# Patient Record
Sex: FEMALE | Race: WHITE | Employment: PART TIME | URBAN - METROPOLITAN AREA
[De-identification: names, ages, dates, MRNs, and addresses within clinical notes are randomized per-mention and may not be internally consistent; named-entity substitution may affect disease eponyms.]

---

## 2019-06-10 PROCEDURE — 88175 CYTOPATH C/V AUTO FLUID REDO: CPT | Performed by: OBSTETRICS & GYNECOLOGY

## 2023-12-16 ENCOUNTER — HOSPITAL ENCOUNTER (INPATIENT)
Facility: HOSPITAL | Age: 33
LOS: 3 days | Discharge: HOME | End: 2023-12-19
Attending: EMERGENCY MEDICINE | Admitting: HOSPITALIST
Payer: COMMERCIAL

## 2023-12-16 ENCOUNTER — APPOINTMENT (OUTPATIENT)
Dept: ULTRASOUND IMAGING | Age: 33
End: 2023-12-16
Attending: EMERGENCY MEDICINE
Payer: COMMERCIAL

## 2023-12-16 ENCOUNTER — APPOINTMENT (OUTPATIENT)
Dept: CT IMAGING | Age: 33
End: 2023-12-16
Attending: EMERGENCY MEDICINE
Payer: COMMERCIAL

## 2023-12-16 DIAGNOSIS — R74.01 TRANSAMINITIS: Primary | ICD-10-CM
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
## 2023-12-16 DIAGNOSIS — S09.90XA INJURY OF HEAD, INITIAL ENCOUNTER: ICD-10-CM

## 2023-12-16 PROBLEM — E87.1 HYPONATREMIA: Status: ACTIVE | Noted: 2023-12-16

## 2023-12-16 PROBLEM — E87.20 METABOLIC ACIDOSIS: Status: ACTIVE | Noted: 2023-12-16

## 2023-12-16 PROBLEM — F10.939 ALCOHOL WITHDRAWAL (HCC): Status: ACTIVE | Noted: 2023-12-16

## 2023-12-16 PROBLEM — E87.6 HYPOKALEMIA: Status: ACTIVE | Noted: 2023-12-16

## 2023-12-16 LAB
ALBUMIN SERPL-MCNC: 4.1 G/DL (ref 3.4–5)
ALBUMIN/GLOB SERPL: 0.8 {RATIO} (ref 1–2)
ALP LIVER SERPL-CCNC: 127 U/L
ALT SERPL-CCNC: 212 U/L
ANION GAP SERPL CALC-SCNC: 14 MMOL/L (ref 0–18)
ANTIBODY SCREEN: NEGATIVE
AST SERPL-CCNC: 427 U/L (ref 15–37)
BASOPHILS # BLD AUTO: 0.06 X10(3) UL (ref 0–0.2)
BASOPHILS NFR BLD AUTO: 0.7 %
BILIRUB SERPL-MCNC: 1.7 MG/DL (ref 0.1–2)
BUN BLD-MCNC: 4 MG/DL (ref 9–23)
CALCIUM BLD-MCNC: 9.4 MG/DL (ref 8.5–10.1)
CHLORIDE SERPL-SCNC: 96 MMOL/L (ref 98–112)
CK SERPL-CCNC: 153 U/L
CO2 SERPL-SCNC: 21 MMOL/L (ref 21–32)
CREAT BLD-MCNC: 0.54 MG/DL
EGFRCR SERPLBLD CKD-EPI 2021: 125 ML/MIN/1.73M2 (ref 60–?)
EOSINOPHIL # BLD AUTO: 0.02 X10(3) UL (ref 0–0.7)
EOSINOPHIL NFR BLD AUTO: 0.2 %
ERYTHROCYTE [DISTWIDTH] IN BLOOD BY AUTOMATED COUNT: 14.5 %
ETHANOL SERPL-MCNC: <3 MG/DL (ref ?–3)
GLOBULIN PLAS-MCNC: 5 G/DL (ref 2.8–4.4)
GLUCOSE BLD-MCNC: 99 MG/DL (ref 70–99)
HCT VFR BLD AUTO: 35.8 %
HGB BLD-MCNC: 12.5 G/DL
IMM GRANULOCYTES # BLD AUTO: 0.16 X10(3) UL (ref 0–1)
IMM GRANULOCYTES NFR BLD: 1.8 %
LYMPHOCYTES # BLD AUTO: 0.82 X10(3) UL (ref 1–4)
LYMPHOCYTES NFR BLD AUTO: 9 %
MCH RBC QN AUTO: 32.8 PG (ref 26–34)
MCHC RBC AUTO-ENTMCNC: 34.9 G/DL (ref 31–37)
MCV RBC AUTO: 94 FL
MONOCYTES # BLD AUTO: 1.2 X10(3) UL (ref 0.1–1)
MONOCYTES NFR BLD AUTO: 13.2 %
NEUTROPHILS # BLD AUTO: 6.81 X10 (3) UL (ref 1.5–7.7)
NEUTROPHILS # BLD AUTO: 6.81 X10(3) UL (ref 1.5–7.7)
NEUTROPHILS NFR BLD AUTO: 75.1 %
OSMOLALITY SERPL CALC.SUM OF ELEC: 269 MOSM/KG (ref 275–295)
PLATELET # BLD AUTO: 192 10(3)UL (ref 150–450)
POTASSIUM SERPL-SCNC: 3.4 MMOL/L (ref 3.5–5.1)
PROT SERPL-MCNC: 9.1 G/DL (ref 6.4–8.2)
RBC # BLD AUTO: 3.81 X10(6)UL
RH BLOOD TYPE: POSITIVE
SODIUM SERPL-SCNC: 131 MMOL/L (ref 136–145)
WBC # BLD AUTO: 9.1 X10(3) UL (ref 4–11)

## 2023-12-16 PROCEDURE — 70450 CT HEAD/BRAIN W/O DYE: CPT | Performed by: EMERGENCY MEDICINE

## 2023-12-16 PROCEDURE — HZ2ZZZZ DETOXIFICATION SERVICES FOR SUBSTANCE ABUSE TREATMENT: ICD-10-PCS | Performed by: HOSPITALIST

## 2023-12-16 PROCEDURE — 70486 CT MAXILLOFACIAL W/O DYE: CPT | Performed by: EMERGENCY MEDICINE

## 2023-12-16 PROCEDURE — 76700 US EXAM ABDOM COMPLETE: CPT | Performed by: EMERGENCY MEDICINE

## 2023-12-16 RX ORDER — LORAZEPAM 2 MG/ML
1 INJECTION INTRAMUSCULAR
Status: DISCONTINUED | OUTPATIENT
Start: 2023-12-16 | End: 2023-12-19

## 2023-12-16 RX ORDER — THIAMINE HYDROCHLORIDE 100 MG/ML
100 INJECTION, SOLUTION INTRAMUSCULAR; INTRAVENOUS ONCE
Status: COMPLETED | OUTPATIENT
Start: 2023-12-16 | End: 2023-12-16

## 2023-12-16 RX ORDER — MELATONIN
100 DAILY
Status: DISCONTINUED | OUTPATIENT
Start: 2023-12-17 | End: 2023-12-19

## 2023-12-16 RX ORDER — POLYETHYLENE GLYCOL 3350 17 G/17G
17 POWDER, FOR SOLUTION ORAL DAILY PRN
Status: DISCONTINUED | OUTPATIENT
Start: 2023-12-16 | End: 2023-12-19

## 2023-12-16 RX ORDER — LORAZEPAM 1 MG/1
1 TABLET ORAL
Status: DISCONTINUED | OUTPATIENT
Start: 2023-12-16 | End: 2023-12-19

## 2023-12-16 RX ORDER — LORAZEPAM 2 MG/ML
1 INJECTION INTRAMUSCULAR ONCE
Status: DISCONTINUED | OUTPATIENT
Start: 2023-12-16 | End: 2023-12-16

## 2023-12-16 RX ORDER — SENNOSIDES 8.6 MG
17.2 TABLET ORAL NIGHTLY PRN
Status: DISCONTINUED | OUTPATIENT
Start: 2023-12-16 | End: 2023-12-19

## 2023-12-16 RX ORDER — FOLIC ACID 1 MG/1
1 TABLET ORAL DAILY
Status: DISCONTINUED | OUTPATIENT
Start: 2023-12-16 | End: 2023-12-19

## 2023-12-16 RX ORDER — PROCHLORPERAZINE EDISYLATE 5 MG/ML
5 INJECTION INTRAMUSCULAR; INTRAVENOUS EVERY 8 HOURS PRN
Status: DISCONTINUED | OUTPATIENT
Start: 2023-12-16 | End: 2023-12-19

## 2023-12-16 RX ORDER — MULTIVITAMIN WITH IRON
50 TABLET ORAL DAILY
Status: ON HOLD | COMMUNITY
End: 2023-12-16

## 2023-12-16 RX ORDER — BISACODYL 10 MG
10 SUPPOSITORY, RECTAL RECTAL
Status: DISCONTINUED | OUTPATIENT
Start: 2023-12-16 | End: 2023-12-19

## 2023-12-16 RX ORDER — ONDANSETRON 2 MG/ML
4 INJECTION INTRAMUSCULAR; INTRAVENOUS EVERY 6 HOURS PRN
Status: DISCONTINUED | OUTPATIENT
Start: 2023-12-16 | End: 2023-12-19

## 2023-12-16 RX ORDER — MULTIPLE VITAMINS W/ MINERALS TAB 9MG-400MCG
1 TAB ORAL DAILY
Status: DISCONTINUED | OUTPATIENT
Start: 2023-12-16 | End: 2023-12-19

## 2023-12-16 RX ORDER — SODIUM CHLORIDE 9 MG/ML
INJECTION, SOLUTION INTRAVENOUS CONTINUOUS
Status: DISCONTINUED | OUTPATIENT
Start: 2023-12-16 | End: 2023-12-19

## 2023-12-16 RX ORDER — LORAZEPAM 2 MG/ML
2 INJECTION INTRAMUSCULAR
Status: DISCONTINUED | OUTPATIENT
Start: 2023-12-16 | End: 2023-12-19

## 2023-12-16 RX ORDER — MELATONIN
325
COMMUNITY

## 2023-12-16 RX ORDER — LORAZEPAM 1 MG/1
2 TABLET ORAL
Status: DISCONTINUED | OUTPATIENT
Start: 2023-12-16 | End: 2023-12-19

## 2023-12-16 RX ORDER — LORAZEPAM 2 MG/ML
2 INJECTION INTRAMUSCULAR ONCE
Status: COMPLETED | OUTPATIENT
Start: 2023-12-16 | End: 2023-12-16

## 2023-12-16 NOTE — ED INITIAL ASSESSMENT (HPI)
PT WAS RECENTLY DIAGNOSED W ANEMIA HGB 6.5 2 MOS AGO HAD A TRANSFUSION 2 DAYS AGO WOKE UP W R FACIAL SWELLING  AND BRUISING.  COVID 2 WEEKS AGO, SORE ALL OVER NO SOB

## 2023-12-17 ENCOUNTER — NURSE ONLY (OUTPATIENT)
Dept: ELECTROPHYSIOLOGY | Facility: HOSPITAL | Age: 33
End: 2023-12-17
Attending: Other
Payer: COMMERCIAL

## 2023-12-17 PROBLEM — R56.9 SEIZURE (HCC): Status: ACTIVE | Noted: 2023-12-17

## 2023-12-17 LAB
ALBUMIN SERPL-MCNC: 3.5 G/DL (ref 3.4–5)
ALBUMIN/GLOB SERPL: 0.9 {RATIO} (ref 1–2)
ALP LIVER SERPL-CCNC: 98 U/L
ALT SERPL-CCNC: 152 U/L
AMPHET UR QL SCN: NEGATIVE
ANION GAP SERPL CALC-SCNC: 15 MMOL/L (ref 0–18)
AST SERPL-CCNC: 257 U/L (ref 15–37)
BASOPHILS # BLD AUTO: 0.04 X10(3) UL (ref 0–0.2)
BASOPHILS NFR BLD AUTO: 0.7 %
BILIRUB SERPL-MCNC: 1.7 MG/DL (ref 0.1–2)
BUN BLD-MCNC: 3 MG/DL (ref 9–23)
CALCIUM BLD-MCNC: 9.3 MG/DL (ref 8.5–10.1)
CHLORIDE SERPL-SCNC: 99 MMOL/L (ref 98–112)
CO2 SERPL-SCNC: 22 MMOL/L (ref 21–32)
COCAINE UR QL: NEGATIVE
CREAT BLD-MCNC: 0.54 MG/DL
CREAT UR-SCNC: 171 MG/DL
EGFRCR SERPLBLD CKD-EPI 2021: 125 ML/MIN/1.73M2 (ref 60–?)
EOSINOPHIL # BLD AUTO: 0.06 X10(3) UL (ref 0–0.7)
EOSINOPHIL NFR BLD AUTO: 1 %
ERYTHROCYTE [DISTWIDTH] IN BLOOD BY AUTOMATED COUNT: 14.2 %
GLOBULIN PLAS-MCNC: 4 G/DL (ref 2.8–4.4)
GLUCOSE BLD-MCNC: 80 MG/DL (ref 70–99)
HCG UR QL: NEGATIVE
HCT VFR BLD AUTO: 32.3 %
HGB BLD-MCNC: 11.1 G/DL
IMM GRANULOCYTES # BLD AUTO: 0.13 X10(3) UL (ref 0–1)
IMM GRANULOCYTES NFR BLD: 2.2 %
LYMPHOCYTES # BLD AUTO: 1.24 X10(3) UL (ref 1–4)
LYMPHOCYTES NFR BLD AUTO: 20.6 %
MAGNESIUM SERPL-MCNC: 1.9 MG/DL (ref 1.6–2.6)
MCH RBC QN AUTO: 32.8 PG (ref 26–34)
MCHC RBC AUTO-ENTMCNC: 34.4 G/DL (ref 31–37)
MCV RBC AUTO: 95.6 FL
MDMA UR QL SCN: NEGATIVE
MONOCYTES # BLD AUTO: 1.07 X10(3) UL (ref 0.1–1)
MONOCYTES NFR BLD AUTO: 17.7 %
NEUTROPHILS # BLD AUTO: 3.49 X10 (3) UL (ref 1.5–7.7)
NEUTROPHILS # BLD AUTO: 3.49 X10(3) UL (ref 1.5–7.7)
NEUTROPHILS NFR BLD AUTO: 57.8 %
OPIATES UR QL SCN: NEGATIVE
OSMOLALITY SERPL CALC.SUM OF ELEC: 278 MOSM/KG (ref 275–295)
OXYCODONE UR QL SCN: NEGATIVE
PLATELET # BLD AUTO: 163 10(3)UL (ref 150–450)
POTASSIUM SERPL-SCNC: 3.2 MMOL/L (ref 3.5–5.1)
PROT SERPL-MCNC: 7.5 G/DL (ref 6.4–8.2)
RBC # BLD AUTO: 3.38 X10(6)UL
SODIUM SERPL-SCNC: 136 MMOL/L (ref 136–145)
WBC # BLD AUTO: 6 X10(3) UL (ref 4–11)

## 2023-12-17 PROCEDURE — 99223 1ST HOSP IP/OBS HIGH 75: CPT | Performed by: OTHER

## 2023-12-17 RX ORDER — POTASSIUM CHLORIDE 20 MEQ/1
40 TABLET, EXTENDED RELEASE ORAL ONCE
Status: COMPLETED | OUTPATIENT
Start: 2023-12-17 | End: 2023-12-17

## 2023-12-17 RX ORDER — ACETAMINOPHEN 325 MG/1
650 TABLET ORAL EVERY 6 HOURS PRN
Status: DISCONTINUED | OUTPATIENT
Start: 2023-12-17 | End: 2023-12-19

## 2023-12-17 NOTE — ED QUICK NOTES
Orders for admission, patient is aware of plan and ready to go upstairs. Any questions, please call ED RN Sherry Byrd at extension [phone].      Patient Covid vaccination status: Unvaccinated     COVID Test Ordered in ED: None    COVID Suspicion at Admission: N/A    Running Infusions:  None    Mental Status/LOC at time of transport: A&Ox4    Other pertinent information: N/A  CIWA score: 6   NIH score:  N/A

## 2023-12-17 NOTE — PLAN OF CARE
Assumed care of patient at 0700. Denies chest pain, sob, lightheadedness, dizziness. Up standby  [facility] protocol. Ivf infusing.          Problem: Patient/Family Goals  Goal: Patient/Family Long Term Goal  Description: Patient's Long Term Goal: go home     Interventions:  - no seizures  - See additional Care Plan goals for specific interventions  Outcome: Progressing  Goal: Patient/Family Short Term Goal  Description: Patient's Short Term Goal: no seizures    Interventions:   - no injury  - See additional Care Plan goals for specific interventions  Outcome: Progressing     Problem: ANXIETY  Goal: Will report anxiety at manageable levels  Description: INTERVENTIONS:  - Administer medication as ordered  - Teach and rehearse alternative coping skills  - Provide emotional support with 1:1 interaction with staff  Outcome: Progressing

## 2023-12-17 NOTE — PROGRESS NOTES
NURSING ADMISSION NOTE      Patient admitted via Ambulance accompanied by 2 EMTs  Oriented to room. Safety precautions initiated. Bed in low position. Call light in reach. A+Ox4  RA  Tele ST/SR  CIWA q 2.  No complaints at this time  IV NS infusing at 83ml/hr  Steady gait  Bruising to R side of face and wound to R side of tongue  Safety/Seizure precautions in place

## 2023-12-18 ENCOUNTER — APPOINTMENT (OUTPATIENT)
Dept: MRI IMAGING | Facility: HOSPITAL | Age: 33
End: 2023-12-18
Attending: Other
Payer: COMMERCIAL

## 2023-12-18 LAB
ANION GAP SERPL CALC-SCNC: 10 MMOL/L (ref 0–18)
BUN BLD-MCNC: 1 MG/DL (ref 9–23)
CALCIUM BLD-MCNC: 8.8 MG/DL (ref 8.5–10.1)
CHLORIDE SERPL-SCNC: 105 MMOL/L (ref 98–112)
CO2 SERPL-SCNC: 20 MMOL/L (ref 21–32)
CREAT BLD-MCNC: 0.48 MG/DL
EGFRCR SERPLBLD CKD-EPI 2021: 129 ML/MIN/1.73M2 (ref 60–?)
GLUCOSE BLD-MCNC: 97 MG/DL (ref 70–99)
OSMOLALITY SERPL CALC.SUM OF ELEC: 276 MOSM/KG (ref 275–295)
POTASSIUM SERPL-SCNC: 3 MMOL/L (ref 3.5–5.1)
POTASSIUM SERPL-SCNC: 3.1 MMOL/L (ref 3.5–5.1)
POTASSIUM SERPL-SCNC: 3.6 MMOL/L (ref 3.5–5.1)
SODIUM SERPL-SCNC: 135 MMOL/L (ref 136–145)

## 2023-12-18 PROCEDURE — 95816 EEG AWAKE AND DROWSY: CPT | Performed by: OTHER

## 2023-12-18 PROCEDURE — 70553 MRI BRAIN STEM W/O & W/DYE: CPT | Performed by: OTHER

## 2023-12-18 RX ORDER — POTASSIUM CHLORIDE 20 MEQ/1
40 TABLET, EXTENDED RELEASE ORAL EVERY 4 HOURS
Status: COMPLETED | OUTPATIENT
Start: 2023-12-18 | End: 2023-12-18

## 2023-12-18 RX ORDER — GADOTERATE MEGLUMINE 376.9 MG/ML
15 INJECTION INTRAVENOUS
Status: COMPLETED | OUTPATIENT
Start: 2023-12-18 | End: 2023-12-18

## 2023-12-18 NOTE — DISCHARGE INSTRUCTIONS
Patient has been scheduled for an appointments through [address] for psychiatry. Tuesday, February 13th @ 08:40AM with GENARO Simpson in-person at [facility] location. Drea Kline [address] [place] [address] Drive, [address]     Please call [phone] with any questions or appointment changes; appointment info can also be viewed in Musicnoteshart. Individual Psychotherapy/Counseling In-Network with BCBS PPO    Melissa Acuna at [facility]    [address] [facility]. 5103 [facility]  Phone: [phone]  [email or web address]  Accepts: Medicaid, self pay, private insurance  Hours of operation: 24 hours a day/7 days a week   Services include:   Mental health services   Inpatient   Outpatient   Substance abuse   Other treatment programs   Eating disorders  Gambling   Depression/Anxiety  Obsessive compulsive disorders       [facility]  [address], [place] [employer], [place] -or- [address] [facility], [facility] [address], [place], 821 [facility]  [address]enn[address] Stone Streetashlyn[facility]iam [facility] 51, [address] [place], [place]  [phone]    [facility]  [address] [place], [place]  [phone]    DAVE ZXHHWM[address]UNC, [facility]  1150 [facility], [address] [place], [place]  [phone]    [facility]   [address] and [address], [address]  [address]  [phone]    826 [facility].  640 [facility], [address] [place] [address]  [phone]    [facility]  2 locations: [address] [place] [address]  - or - [address] [place], [place]   [phone]    [facility], [place], Levon Mackenzie and [place]   [phone]    [address], [address], [place], [place]  [phone]    [facility] 19, [address], [place], [place]  [phone]    400 [facility] Tree [facility], [address], [facility] [place]  [phone]    [facility]  [address], [address] 2500 [facility], [address]  [address]  [address] 5995 [facility], [facility] 815 [place], [place]  [phone]    Based on your assessment and our conversation today we've agreed on the following resource recommendations. Recommended Community Resources:  SDOH Referred for: Food Resource    SDOH Resource Details  SDOH Resource (Food): [address], local food pantries    Please follow this link [email or web address] to search for and be connected with resources in your community.

## 2023-12-18 NOTE — CM/SW NOTE
12/18/23 1508   OTHER   SDOH Outreach Status Complete   SDOH Referred for Food Resource   SDOH Resource Details   SDOH Resource (Food) SNAP, [address], local food pantries     Pt is a 28 y/o female admitted with transaminitis. SW received order for SDOH. Chart reviewed, pt triggered on SDOH for food insecurity. SW met with pt at bedside. Pt appeared tearful and stated she is 'having a moment' but stated she was able to speak with SW. SW provided pt with resources from [place] including Destiny Sena, [address], and Rain. SW encouraged pt to utilize Find Help to locate additional resources. Pt denied any further needs at this time. SW will continue to remain available.      MELECIO Wood  Discharge Planner

## 2023-12-18 NOTE — PROGRESS NOTES
Psych Liaison on consult per PHQ-4 score. Upon eval, patient cooperative and insightful, stated concerns of increase anxiety and panic as of late. Patient denied depressive symptoms, denied suicidal ideation. Patient reports social anxiety and ETOH abuse causing more anxiety symptoms in her everyday living. Patient has no hx of MH treatment or psych providers, no hx of psych meds. Patient recommended to speak to outpatient psychiatry and therapy to manage symptoms. Patient scheduled with [facility]; [facility] therapy full at this time. Referrals placed in discharge summary. Patient has been scheduled for an appointments through [address] for psychiatry. Tuesday, February 13th @ 08:40AM with GENARO Rios in-person at [facility] location. Drea Hawthorne[address], [address]     Please call [phone] with any questions or appointment changes; appointment info can also be viewed in myChart.

## 2023-12-18 NOTE — PROGRESS NOTES
Patient met with the  from [place], Tammy Nixon. Patient appears to be motivated to stop drinking but appears to have a barrier, she stated that she has anxiety attacks and has never been diagnosed or treated. Patient will need to be evaluated before considering [facility] residential treatment.

## 2023-12-18 NOTE — PROCEDURES
.  EEG REPORT;    Reason for Examination: seizure    Technical Summary:   18 Channels of EEG and 1 Channel of EKG was performed utilizing internation 10/20 method. Background Activity: The background activity consisted of 11 Hz waveforms, reactive to eye opening/ external stimulation. Abnormality:  Intermittent beta activity noted. Activation:    Hyperventilation:   Not Performed. Photic Stimulation:  Driving response seen. No       Sleep:  Stage I sleep seen. Impression: This is a Abnormal EEG. No focal, lateralized or generalized epileptiform activity seen. Beta activity seen was most likely due to medication effects. Clinical correlation is recommended. Geremias Romo MD  [facility].

## 2023-12-18 NOTE — PROGRESS NOTES
Individual Psychotherapy/Counseling In-Network with BCKILLIAN JACKY    [facility] at [facility]    [address]. [address] [place] [address] Mansoor RODRIGUEZ  Phone: [phone]  [email or web address]  Accepts: Medicaid, self pay, private insurance  Hours of operation: 24 hours a day/7 days a week   Services include:   Mental health services   Inpatient   Outpatient   Substance abuse   Other treatment programs   Eating disorders  Gambling   Depression/Anxiety  Obsessive compulsive disorders       [facility] for [facility]  [address] [place] -or- 101 E [facility], [address], [facility], 821 N [facility]  [address] [address] S[facility] Hospitals Health Systemras 51, [address] [facility] [address], [facility] Claudia Pittman  [phone]    [facility]  [address], [facility] Claudia Pittman  [phone]    UHPXZ BCCXLFVBVJ, [facility]  1150 [facility], [address] 110 [facility] Claudia Pittman  [phone]    [facility]   [address], Claud[place] Pittman and [address], [address] [facility] [address]  [phone]    [address] [place] [address]. 640 [facility], [address] [place] [address]  [phone]    [facility]  2 locations: [address] [place] [address] Claudia Pittman  - or - 1125 [facility] Claudia Pittman   [phone]    [facility], [facility], Levon Mackenzie, and [place]   [phone]    [address] [place] [address]  121 [facility], [address], [facility], Claudia Pittman  [phone]    [facility] 19, [address], [facility] Claudia Pittman  [phone]    400 [facility], [address], [place]  [phone]    [facility]  [address], [address] 2500 [facility], [address]  [address]  [address]  5995 [facility], Stephen 815 [facility]  [phone]

## 2023-12-18 NOTE — PLAN OF CARE
The patient is A/Ox4   On RA, no SOB  Tele - NSR/ST  Vitals Stable  Afebrile  No c/o of pain. CIWA protocol, not scoring for Ativan  MRI brain is pending  IVF - NaCl @ 83 ml/hr  Safety precautions in place. Staff will continue to monitor.                Problem: Patient/Family Goals  Goal: Patient/Family Long Term Goal  Description: Patient's Long Term Goal: dc    Interventions:  - medications   - See additional Care Plan goals for specific interventions  Outcome: Progressing  Goal: Patient/Family Short Term Goal  Description: Patient's Short Term Goal: safety    Interventions:   - frequent rounding   - See additional Care Plan goals for specific interventions  Outcome: Progressing     Problem: ANXIETY  Goal: Will report anxiety at manageable levels  Description: INTERVENTIONS:  - Administer medication as ordered  - Teach and rehearse alternative coping skills  - Provide emotional support with 1:1 interaction with staff  Outcome: Progressing

## 2023-12-18 NOTE — PLAN OF CARE
Assumed care at 1930  A/Ox4, on room air, NSR on tele  Regular diet  Q2 ciwa, seizure precautions in place  Ambulates, voids  PIV infusing 0.9 nacl at 83 ml/hr  Patient updated with plan of care  All needs met at this time     Problem: ANXIETY  Goal: Will report anxiety at manageable levels  Description: INTERVENTIONS:  - Administer medication as ordered  - Teach and rehearse alternative coping skills  - Provide emotional support with 1:1 interaction with staff  Outcome: Progressing

## 2023-12-19 ENCOUNTER — APPOINTMENT (OUTPATIENT)
Dept: CT IMAGING | Facility: HOSPITAL | Age: 33
End: 2023-12-19
Payer: COMMERCIAL

## 2023-12-19 VITALS
RESPIRATION RATE: 18 BRPM | DIASTOLIC BLOOD PRESSURE: 91 MMHG | WEIGHT: 124 LBS | TEMPERATURE: 99 F | SYSTOLIC BLOOD PRESSURE: 116 MMHG | HEART RATE: 77 BPM | HEIGHT: 64 IN | OXYGEN SATURATION: 97 % | BODY MASS INDEX: 21.17 KG/M2

## 2023-12-19 PROCEDURE — 70450 CT HEAD/BRAIN W/O DYE: CPT

## 2023-12-19 PROCEDURE — 99232 SBSQ HOSP IP/OBS MODERATE 35: CPT

## 2023-12-19 RX ORDER — FOLIC ACID 1 MG/1
1 TABLET ORAL DAILY
Qty: 30 TABLET | Refills: 0 | Status: SHIPPED | OUTPATIENT
Start: 2023-12-20

## 2023-12-19 RX ORDER — MELATONIN
100 DAILY
Qty: 30 TABLET | Refills: 0 | Status: SHIPPED | OUTPATIENT
Start: 2023-12-20

## 2023-12-19 NOTE — PLAN OF CARE
Assumed pt care @ 1930. A/O x4  RA   ST/ NSR on tele   R PIV, 0.9NS @ 83ml/hr  Denies pain   Safety precautions maintained. All needs met currently.       Problem: ANXIETY  Goal: Will report anxiety at manageable levels  Description: INTERVENTIONS:  - Administer medication as ordered  - Teach and rehearse alternative coping skills  - Provide emotional support with 1:1 interaction with staff  Outcome: Progressing

## 2023-12-19 NOTE — CDS QUERY
CLINICAL DOCUMENTATION CLARIFICATION FORM     Dear Giuliana Alonzo   Documentation (provided below) includes a documented treatment of thiamine with a diagnosis of alcohol withdrawal.   PLEASE provide a diagnosis associated with the administration of  IV Thiamine for this patient who is here for alcohol withdrawal   [  ] Evidence of thiamine deficiency due to chronic alcohol use/withdrawal   [ x ] Prophylactic treatment of potential thiamine deficiency associated with chronic alcohol use/withdrawal        [  ] Other (please specify) ___________________________________________________________________      Documentation from the medical record  Risk Factor history of chronic alcohol use   Clinical Indicators   >ER note - States she does drink, was previously 6 beers a day, recently cut down to about 2 beers a day. On further questioning, patient states she did wake up 2 nights ago, feeling like she was confused, unaware of her surroundings this is never happened to her before. The next day she noticed her tongue was bothering her, also felt like her whole body was sore. Today was when she noticed the swelling on the right side of her face seem to be worsening. Does have an elevated CIWA score on arrival, received 4 mg of Ativan with some improvement in her symptoms, heart rate around 105 still with mild tremor. > 12/17/23 Neurohospitalist- History of chronic alcohol use. Advised thiamine supplementation. Patient  counseled regarding detox. Patient's mother also keen on sending patient to detox facility. Abnormal liver function studies. Most likely due to alcohol-related hepatitis/chronic liver disease. Assessment: New onset seizure probably exacerbated by alcohol use. thiamine (Vitamin B1) tab 100 mg  Dose: 100 mg  Freq: Daily Route: OR  Start: 12/17/23 0930           0846 MT-Given      0939 AM-Given      0848 CS-Given        thiamine 100 mg/mL injection 100 mg  Dose: 100 mg  Freq:  Once Route: IV  Start: 12/16/23 2230 End: 12/16/23 2310   Admin Instructions: If pt did not already receive dose. Must administer prior to starting IV fluids with dextrose  For IV Push Doses: Administer slowly over 1 to 2 minute          2310 KS-Given             Treatment CIWA protocol, Psych liaison on consult, advise thiamine supplementation, counseling regarding detox,         For questions regarding this query, please contact: Clinical  Radha Desouza RN/BSN @ [phone] or email Margo [email or web address]. [email or web address]       THIS FORM IS A PERMANENT PART OF THE MEDICAL RECORD

## 2023-12-19 NOTE — PLAN OF CARE
The patient is A/Ox4   On RA, no SOB  Tele - NSR/ST  Vitals Stable  Afebrile  No c/o of pain. CT brain completed, ok to dc per neuro  Safety precautions in place. Staff will continue to monitor.                Problem: Patient/Family Goals  Goal: Patient/Family Long Term Goal  Description: Patient's Long Term Goal: dc    Interventions:  - medications   - See additional Care Plan goals for specific interventions  Outcome: Progressing  Goal: Patient/Family Short Term Goal  Description: Patient's Short Term Goal: safety    Interventions:   - frequent rounding   - See additional Care Plan goals for specific interventions  Outcome: Progressing     Problem: ANXIETY  Goal: Will report anxiety at manageable levels  Description: INTERVENTIONS:  - Administer medication as ordered  - Teach and rehearse alternative coping skills  - Provide emotional support with 1:1 interaction with staff  Outcome: Progressing

## 2023-12-21 LAB — BENZODIAZEPINES CLASS UR: NEGATIVE

## 2023-12-23 LAB
CARBOXY THC GCMS UR: 56 NG/MG CREAT
CARBOXY THC GCMS UR: 56 NG/MG CREAT